# Patient Record
Sex: MALE | Race: OTHER | Employment: FULL TIME | URBAN - NONMETROPOLITAN AREA
[De-identification: names, ages, dates, MRNs, and addresses within clinical notes are randomized per-mention and may not be internally consistent; named-entity substitution may affect disease eponyms.]

---

## 2020-01-18 ENCOUNTER — OFFICE VISIT (OUTPATIENT)
Dept: FAMILY MEDICINE CLINIC | Age: 35
End: 2020-01-18
Payer: COMMERCIAL

## 2020-01-18 VITALS
HEIGHT: 63 IN | SYSTOLIC BLOOD PRESSURE: 132 MMHG | BODY MASS INDEX: 32.07 KG/M2 | DIASTOLIC BLOOD PRESSURE: 82 MMHG | HEART RATE: 62 BPM | WEIGHT: 181 LBS | OXYGEN SATURATION: 98 %

## 2020-01-18 PROCEDURE — 99202 OFFICE O/P NEW SF 15 MIN: CPT | Performed by: NURSE PRACTITIONER

## 2020-01-18 RX ORDER — FLUCONAZOLE 100 MG/1
100 TABLET ORAL DAILY
Qty: 5 TABLET | Refills: 0 | Status: SHIPPED | OUTPATIENT
Start: 2020-01-18 | End: 2020-01-23

## 2020-01-18 NOTE — PROGRESS NOTES
Review of Systems   Skin: Positive for rash. Objective:     /82 (Site: Left Upper Arm, Position: Sitting)   Pulse 62   Ht 5' 3\" (1.6 m)   Wt 181 lb (82.1 kg)   SpO2 98%   BMI 32.06 kg/m²     Physical Exam  Vitals signs reviewed. Constitutional:       Appearance: Normal appearance. Skin:     General: Skin is warm and dry. Capillary Refill: Capillary refill takes less than 2 seconds. Findings: Erythema and rash present. Neurological:      Mental Status: He is alert. Assessment:      Diagnosis Orders   1. Tinea pedis of both feet  fluconazole (DIFLUCAN) 100 MG tablet    triamcinolone (KENALOG) 0.1 % ointment   2. Dyshidrotic eczema  triamcinolone (KENALOG) 0.1 % ointment     No results found for this visit on 01/18/20. Plan:     Diflucan tablet daily for 5 days. Apply antifungal twice daily. Use triamcinolone ointment twice daily on weekends only. Follow up with primary care provider in 1 to 2 days if needed. Patient Instructions     Diflucan tablet daily for 5 days. Apply antifungal twice daily. Use triamcinolone ointment twice daily on weekends only. Follow up with primary care provider in 1 to 2 days if needed. Patient Education        Athlete's Foot: Care Instructions  Your Care Instructions    Athlete's foot is an itchy rash on the foot caused by an infection with a fungus. You can get it by going barefoot in wet public areas, such as swimming pools or locker rooms. Many times there is no clear reason why you get athlete's foot. You can easily treat athlete's foot by putting medicine on your feet for 1 to 6 weeks. In some cases, a doctor may prescribe pills to kill the fungus. Follow-up care is a key part of your treatment and safety. Be sure to make and go to all appointments, and call your doctor if you are having problems. It's also a good idea to know your test results and keep a list of the medicines you take.   How can you to things that cause allergies. The immune system is the body's way of fighting infection. There is no cure for atopic dermatitis, but you may be able to control it with care at home. Follow-up care is a key part of your treatment and safety. Be sure to make and go to all appointments, and call your doctor if you are having problems. It's also a good idea to know your test results and keep a list of the medicines you take. How can you care for yourself at home? · Use moisturizer at least twice a day. · If your doctor prescribes a cream, use it as directed. If your doctor prescribes other medicine, take it exactly as directed. · Wash the affected area with water only. Soap can make dryness and itching worse. Pat dry. · Apply a moisturizer after bathing. Use a cream such as Lubriderm, Moisturel, or Cetaphil that does not irritate the skin or cause a rash. Apply the cream while your skin is still damp after lightly drying with a towel. · Use cold, wet cloths to reduce itching. · Keep cool, and stay out of the sun. · If itching affects your normal activities, an over-the-counter antihistamine, such as diphenhydramine (Benadryl) or loratadine (Claritin) may help. Read and follow all instructions on the label. When should you call for help? Call your doctor now or seek immediate medical care if:    · Your rash gets worse and you have a fever.     · You have new blisters or bruises, or the rash spreads and looks like a sunburn.     · You have signs of infection, such as:  ? Increased pain, swelling, warmth, or redness. ? Red streaks leading from the rash. ? Pus draining from the rash.   ? A fever.     · You have crusting or oozing sores.     · You have joint aches or body aches along with your rash.    Watch closely for changes in your health, and be sure to contact your doctor if:    · Your rash does not clear up after 2 to 3 weeks of home treatment.     · Itching interferes with your sleep or daily activities. Where can you learn more? Go to https://chpepiceweb.[employer]. org and sign in to your [employer] account. Enter W656 in the [employer]it box to learn more about \"Atopic Dermatitis: Care Instructions. \"     If you do not have an account, please click on the \"Sign Up Now\" link. Current as of: April 1, 2019  Content Version: 12.3  © 2820-9220 Healthwise, [employer]. Care instructions adapted under license by [facility] ([facility]). If you have questions about a medical condition or this instruction, always ask your healthcare professional. [place] [address] any warranty or liability for your use of this information. Patient/Caregiver instructed on use, benefit, and side effects of prescribed medications. All patient/parent/caregiver questions answered. Patient/parent/caregiver voiced understanding. Reviewed health maintenance. Instructed to continue current medications, diet and exercise. Patient agreed with treatment plan. Follow up as directed.            Electronically signed by FARA Zapata CNP on1/18/2020

## 2020-01-18 NOTE — PATIENT INSTRUCTIONS
Diflucan tablet daily for 5 days. Apply antifungal twice daily. Use triamcinolone ointment twice daily on weekends only. Follow up with primary care provider in 1 to 2 days if needed. Patient Education        Athlete's Foot: Care Instructions  Your Care Instructions    Athlete's foot is an itchy rash on the foot caused by an infection with a fungus. You can get it by going barefoot in wet public areas, such as swimming pools or locker rooms. Many times there is no clear reason why you get athlete's foot. You can easily treat athlete's foot by putting medicine on your feet for 1 to 6 weeks. In some cases, a doctor may prescribe pills to kill the fungus. Follow-up care is a key part of your treatment and safety. Be sure to make and go to all appointments, and call your doctor if you are having problems. It's also a good idea to know your test results and keep a list of the medicines you take. How can you care for yourself at home? · Your doctor may suggest an over-the counter lotion or spray or may prescribe a medicine. Take your medicines exactly as prescribed. Call your doctor if you think you are having a problem with your medicine. · Keep your feet clean and dry. · When you get dressed, put your socks on before your underwear. This can prevent the fungus from spreading from your feet to your groin. To prevent athlete's foot  · Wear flip-flops or other shower sandals in public locker rooms and showers and by the pool. · Dry between your toes after swimming or bathing. · Wear leather shoes or sandals, which let air get to your feet. · Change your socks as needed so your feet stay as dry as possible. · Use antifungal powder on your feet. When should you call for help? Watch closely for changes in your health, and be sure to contact your doctor if:    · You do not get better as expected. Where can you learn more? Go to https://chbraydeneb.[facility]. org and sign in to your MyChart stay out of the sun. · If itching affects your normal activities, an over-the-counter antihistamine, such as diphenhydramine (Benadryl) or loratadine (Claritin) may help. Read and follow all instructions on the label. When should you call for help? Call your doctor now or seek immediate medical care if:    · Your rash gets worse and you have a fever.     · You have new blisters or bruises, or the rash spreads and looks like a sunburn.     · You have signs of infection, such as:  ? Increased pain, swelling, warmth, or redness. ? Red streaks leading from the rash. ? Pus draining from the rash. ? A fever.     · You have crusting or oozing sores.     · You have joint aches or body aches along with your rash.    Watch closely for changes in your health, and be sure to contact your doctor if:    · Your rash does not clear up after 2 to 3 weeks of home treatment.     · Itching interferes with your sleep or daily activities. Where can you learn more? Go to https://eDoorways InternationalpeLightSail Energyeweb.Paybubble. org and sign in to your [employer] account. Enter H751 in the [employer] box to learn more about \"Atopic Dermatitis: Care Instructions. \"     If you do not have an account, please click on the \"Sign Up Now\" link. Current as of: April 1, 2019  Content Version: 12.3  © 5538-7422 Healthwise, Inc[employer]. Care instructions adapted under license by [facility] ([facility]). If you have questions about a medical condition or this instruction, always ask your healthcare professional. [place] [address] any warranty or liability for your use of this information.

## 2020-12-09 ENCOUNTER — OFFICE VISIT (OUTPATIENT)
Dept: FAMILY MEDICINE CLINIC | Age: 35
End: 2020-12-09
Payer: COMMERCIAL

## 2020-12-09 ENCOUNTER — HOSPITAL ENCOUNTER (OUTPATIENT)
Age: 35
Setting detail: SPECIMEN
Discharge: HOME | End: 2020-12-09
Payer: COMMERCIAL

## 2020-12-09 VITALS
OXYGEN SATURATION: 98 % | SYSTOLIC BLOOD PRESSURE: 130 MMHG | TEMPERATURE: 99 F | HEART RATE: 51 BPM | WEIGHT: 185 LBS | BODY MASS INDEX: 32.77 KG/M2 | DIASTOLIC BLOOD PRESSURE: 78 MMHG

## 2020-12-09 LAB
INFLUENZA A ANTIBODY: NEGATIVE
INFLUENZA B ANTIBODY: NEGATIVE

## 2020-12-09 PROCEDURE — 99214 OFFICE O/P EST MOD 30 MIN: CPT

## 2020-12-09 PROCEDURE — U0003 INFECTIOUS AGENT DETECTION BY NUCLEIC ACID (DNA OR RNA); SEVERE ACUTE RESPIRATORY SYNDROME CORONAVIRUS 2 (SARS-COV-2) (CORONAVIRUS DISEASE [COVID-19]), AMPLIFIED PROBE TECHNIQUE, MAKING USE OF HIGH THROUGHPUT TECHNOLOGIES AS DESCRIBED BY CMS-2020-01-R: HCPCS

## 2020-12-09 PROCEDURE — 99214 OFFICE O/P EST MOD 30 MIN: CPT | Performed by: NURSE PRACTITIONER

## 2020-12-09 PROCEDURE — 87804 INFLUENZA ASSAY W/OPTIC: CPT | Performed by: NURSE PRACTITIONER

## 2020-12-09 RX ORDER — OSELTAMIVIR PHOSPHATE 75 MG/1
75 CAPSULE ORAL 2 TIMES DAILY
Qty: 10 CAPSULE | Refills: 0 | Status: SHIPPED | OUTPATIENT
Start: 2020-12-09 | End: 2020-12-14

## 2020-12-09 RX ORDER — ONDANSETRON 4 MG/1
4 TABLET, FILM COATED ORAL EVERY 8 HOURS PRN
Qty: 30 TABLET | Refills: 0 | Status: SHIPPED | OUTPATIENT
Start: 2020-12-09

## 2020-12-09 ASSESSMENT — PATIENT HEALTH QUESTIONNAIRE - PHQ9
SUM OF ALL RESPONSES TO PHQ QUESTIONS 1-9: 0
SUM OF ALL RESPONSES TO PHQ QUESTIONS 1-9: 0
1. LITTLE INTEREST OR PLEASURE IN DOING THINGS: 0
SUM OF ALL RESPONSES TO PHQ QUESTIONS 1-9: 0
2. FEELING DOWN, DEPRESSED OR HOPELESS: 0
SUM OF ALL RESPONSES TO PHQ9 QUESTIONS 1 & 2: 0

## 2020-12-09 ASSESSMENT — ENCOUNTER SYMPTOMS
DIARRHEA: 1
COUGH: 1
RHINORRHEA: 0
SORE THROAT: 0
NAUSEA: 1
SCALP TENDERNESS: 0
VOMITING: 0

## 2020-12-09 NOTE — LETTER
[facility]  [address]  Phone: [phone]  Fax: [phone]    FARA Saucedo NP        12/09/2020    Patient: Tiffani Sow   YOB: 1985   Date of Visit: 12/09/2020       To Whom it May Concern:    Aileen Gill was seen in my clinic on 12/09/2020 He may return to work/school after a negative covid test result (results expected in appx 5-7 days) and marked symptom improvement. Patient should be fever free for 24 hours without medication. If you have any questions or concerns, please don't hesitate to call.     Sincerely,         FARA Saucedo NP

## 2020-12-09 NOTE — PROGRESS NOTES
[address], APRN-CNP  [address]  Phone:  [phone]  Fax:  [phone]  Jacky Rojas is a 28 y.o. male who presents today for his medical conditions/complaints as noted below. Jacky Rojas c/o of Headache (nausea, fatigue, no fever or chills)      HPI:     Headache    This is a new problem. The current episode started yesterday. The problem has been waxing and waning. The pain is located in the bilateral region. The pain does not radiate. The quality of the pain is described as band-like and squeezing. The pain is at a severity of 9/10. Associated symptoms include anorexia, coughing and nausea. Pertinent negatives include no fever, rhinorrhea, scalp tenderness, sore throat or vomiting. Wt Readings from Last 3 Encounters:   12/09/20 185 lb (83.9 kg)   01/18/20 181 lb (82.1 kg)       Temp Readings from Last 3 Encounters:   12/09/20 99 °F (37.2 °C)       BP Readings from Last 3 Encounters:   12/09/20 130/78   01/18/20 132/82       Pulse Readings from Last 3 Encounters:   12/09/20 51   01/18/20 62              History reviewed. No pertinent past medical history. History reviewed. No pertinent surgical history. Family History   Problem Relation Age of Onset    Cancer Mother         ovarian     Social History     Tobacco Use    Smoking status: Never Smoker    Smokeless tobacco: Never Used   Substance Use Topics    Alcohol use: Not Currently      Current Outpatient Medications   Medication Sig Dispense Refill    ondansetron (ZOFRAN) 4 MG tablet Take 1 tablet by mouth every 8 hours as needed for Nausea 30 tablet 0    oseltamivir (TAMIFLU) 75 MG capsule Take 1 capsule by mouth 2 times daily for 5 days 10 capsule 0     No current facility-administered medications for this visit.       Allergies   Allergen Reactions    Dust Mite Extract Other (See Comments)     itchey eyes, runny nose, scratchy throat    Gramineae Pollens Other (See Comments)     Itchy eyes, scatchy throat, runny nose    Shellfish-Derived Products Other (See Comments)     Throat tightens up       No exam data present    Subjective:      Review of Systems   Constitutional: Positive for fatigue. Negative for chills and fever. HENT: Positive for congestion. Negative for rhinorrhea and sore throat. Respiratory: Positive for cough. Gastrointestinal: Positive for anorexia, diarrhea and nausea. Negative for vomiting. Skin: Negative for rash. Neurological: Positive for headaches. Objective:     /78 (Site: Right Upper Arm, Position: Sitting, Cuff Size: Large Adult)   Pulse 51   Temp 99 °F (37.2 °C)   Wt 185 lb (83.9 kg)   SpO2 98%   BMI 32.77 kg/m²     Physical Exam  Vitals signs reviewed. Constitutional:       General: He is not in acute distress. Appearance: He is well-developed. He is ill-appearing. He is not toxic-appearing or diaphoretic. HENT:      Head: Normocephalic. Right Ear: Tympanic membrane, ear canal and external ear normal.      Left Ear: Tympanic membrane, ear canal and external ear normal.      Nose: Congestion present. No mucosal edema or rhinorrhea. Mouth/Throat:      Mouth: Mucous membranes are moist. Mucous membranes are not pale and not dry. Pharynx: Oropharynx is clear. Eyes:      General: Lids are normal. No scleral icterus. Right eye: No discharge. Left eye: No discharge. Extraocular Movements:      Right eye: No nystagmus. Left eye: No nystagmus. Conjunctiva/sclera: Conjunctivae normal.   Neck:      Musculoskeletal: Full passive range of motion without pain and normal range of motion. Trachea: Trachea normal.   Cardiovascular:      Rate and Rhythm: Normal rate and regular rhythm. Heart sounds: Normal heart sounds. Pulmonary:      Effort: Pulmonary effort is normal. No accessory muscle usage or respiratory distress. Breath sounds: Normal breath sounds. Musculoskeletal: Normal range of motion. Skin:     General: Skin is warm and dry. Capillary Refill: Capillary refill takes less than 2 seconds. Coloration: Skin is not pale. Neurological:      Mental Status: He is alert and oriented to person, place, and time. Psychiatric:         Mood and Affect: Mood normal.         Speech: Speech normal.         Behavior: Behavior normal.         Thought Content: Thought content normal.         Judgment: Judgment normal.         Assessment:      Diagnosis Orders   1. Influenza A  oseltamivir (TAMIFLU) 75 MG capsule   2. Fatigue, unspecified type  POCT Influenza A/B    COVID-19 Ambulatory   3. Nausea  POCT Influenza A/B    COVID-19 Ambulatory    ondansetron (ZOFRAN) 4 MG tablet     No results found for this visit on 12/09/20. Plan:       Please go to the emergency room if you become short of breath, have weakness or extreme fatigue or if you feel you may be dehydrated. You may call the office if it is during normal business hours and request a nurse visit where we check you pulse oximetry/oxygen level. If your level is too low you will be sent to the hospital for further treatment. You are being provided a work or school excuse so you may quarantine until you test results are back. If you are COVID positive you will be given an additional excuse to lengthen your quarantine. Practice coughing and deep breathing every hour while awake. If you are laying down, change positions frequently. Try laying on your stomach to open up your lungs more. Follow up with primary care provider in 1 to 2 days if needed. Patient Instructions     Please go to the emergency room if you become short of breath, have weakness or extreme fatigue or if you feel you may be dehydrated. You may call the office if it is during normal business hours and request a nurse visit where we check you pulse oximetry/oxygen level.   If your level is too low you will be sent to the hospital for further treatment. You are being provided a work or school excuse so you may quarantine until you test results are back. If you are COVID positive you will be given an additional excuse to lengthen your quarantine. Practice coughing and deep breathing every hour while awake. If you are laying down, change positions frequently. Try laying on your stomach to open up your lungs more. Follow up with primary care provider in 1 to 2 days if needed. Tamiflu as directed. Zofran as needed for nausea. COVID note. Patient Education        Fatigue: Care Instructions  Your Care Instructions     Fatigue is a feeling of tiredness, exhaustion, or lack of energy. You may feel fatigue because of too much or not enough activity. It can also come from stress, lack of sleep, boredom, and poor diet. Many medical problems, such as viral infections, can cause fatigue. Emotional problems, especially depression, are often the cause of fatigue. Fatigue is most often a symptom of another problem. Treatment for fatigue depends on the cause. For example, if you have fatigue because you have a certain health problem, treating this problem also treats your fatigue. If depression or anxiety is the cause, treatment may help. Follow-up care is a key part of your treatment and safety. Be sure to make and go to all appointments, and call your doctor if you are having problems. It's also a good idea to know your test results and keep a list of the medicines you take. How can you care for yourself at home? · Get regular exercise. But don't overdo it. Go back and forth between rest and exercise. · Get plenty of rest.  · Eat a healthy diet. Do not skip meals, especially breakfast.  · Reduce your use of caffeine, tobacco, and alcohol. Caffeine is most often found in coffee, tea, cola drinks, and chocolate. · Limit medicines that can cause fatigue.  This includes tranquilizers and cold and allergy medicines. When should you call for help? Watch closely for changes in your health, and be sure to contact your doctor if:    · You have new symptoms such as fever or a rash.     · Your fatigue gets worse.     · You have been feeling down, depressed, or hopeless. Or you may have lost interest in things that you usually enjoy.     · You are not getting better as expected. Where can you learn more? Go to https://Biosynthetic TechnologiespeDiarize.Pinterest. org and sign in to your WeShow account. Enter R845 in the [employer] box to learn more about \"Fatigue: Care Instructions. \"     If you do not have an account, please click on the \"Sign Up Now\" link. Current as of: June 26, 2019               Content Version: 12.6  © 2172-9862 [employer]. Care instructions adapted under license by [facility] ([facility]). If you have questions about a medical condition or this instruction, always ask your healthcare professional. [place] [address] any warranty or liability for your use of this information. Patient Education        Nausea and Vomiting: Care Instructions  Your Care Instructions     When you are nauseated, you may feel weak and sweaty and notice a lot of saliva in your mouth. Nausea often leads to vomiting. Most of the time you do not need to worry about nausea and vomiting, but they can be signs of other illnesses. Two common causes of nausea and vomiting are stomach flu and food poisoning. Nausea and vomiting from viral stomach flu will usually start to improve within 24 hours. Nausea and vomiting from food poisoning may last from 12 to 48 hours. The doctor has checked you carefully, but problems can develop later. If you notice any problems or new symptoms, get medical treatment right away. Follow-up care is a key part of your treatment and safety. Be sure to make and go to all appointments, and call your doctor if you are having problems.  It's also a good idea to know your test results and keep a list of the medicines you take. How can you care for yourself at home? · To prevent dehydration, drink plenty of fluids, enough so that your urine is light yellow or clear like water. Choose water and other caffeine-free clear liquids until you feel better. If you have kidney, heart, or liver disease and have to limit fluids, talk with your doctor before you increase the amount of fluids you drink. · Rest in bed until you feel better. · When you are able to eat, try clear soups, mild foods, and liquids until all symptoms are gone for 12 to 48 hours. Other good choices include dry toast, crackers, cooked cereal, and gelatin dessert, such as Jell-O. When should you call for help? Call 911 anytime you think you may need emergency care. For example, call if:    · You passed out (lost consciousness). Call your doctor now or seek immediate medical care if:    · You have symptoms of dehydration, such as:  ? Dry eyes and a dry mouth. ? Passing only a little dark urine. ? Feeling thirstier than usual.     · You have new or worsening belly pain.     · You have a new or higher fever.     · You vomit blood or what looks like coffee grounds. Watch closely for changes in your health, and be sure to contact your doctor if:    · You have ongoing nausea and vomiting.     · Your vomiting is getting worse.     · Your vomiting lasts longer than 2 days.     · You are not getting better as expected. Where can you learn more? Go to https://Allied Resource CorporationpeIzzui.Location Labs. org and sign in to your Kin Community account. Enter [id number] in the [facility] box to learn more about \"Nausea and Vomiting: Care Instructions. \"     If you do not have an account, please click on the \"Sign Up Now\" link. Current as of: June 26, 2019               Content Version: 12.6  © 6418-8638 [employer]. Care instructions adapted under license by [facility] ([facility]).  If you have questions about a medical condition or this instruction, always ask your healthcare professional. [place] [address] any warranty or liability for your use of this information. Patient Education        Learning About Coronavirus [phone])  Coronavirus [phone]): Overview  What is coronavirus ([id number])? The coronavirus disease (COVID-19) is caused by a virus. It is an illness that was first found in December 2019. It has since spread worldwide. The virus can cause fever, cough, and trouble breathing. In severe cases, it can cause pneumonia and make it hard to breathe without help. It can cause death. This virus spreads person-to-person through droplets from coughing and sneezing. It can also spread when you are close to someone who is infected. And it can spread when you touch something that has the virus on it, such as a doorknob or a tabletop. Coronaviruses are a large group of viruses. They cause the common cold. They also cause more serious illnesses like Middle East respiratory syndrome (MERS) and severe acute respiratory syndrome (SARS). COVID-19 is caused by a novel coronavirus. That means it's a new type that has not been seen in people before. How is COVID-19 treated? Mild illness can be treated at home, but more serious illness needs to be treated in the hospital. Treatment may include medicines to reduce symptoms, plus breathing support such as oxygen therapy or a ventilator. Other treatments, such as antiviral medicines, may help people who have COVID-19. What can you do to protect yourself from COVID-19? The best way to protect yourself from getting sick is to:  · Avoid areas where there is an outbreak. · Avoid contact with people who may be infected. · Avoid crowds and try to stay at least 6 feet away from other people. · Wash your hands often, especially after you cough or sneeze. Use soap and water, and scrub for at least 20 seconds.  If soap and water aren't available, use an alcohol-based hand . · Avoid touching your mouth, nose, and eyes. What can you do to avoid spreading the virus to others? To help avoid spreading the virus to others:  · Wash your hands often with soap or alcohol-based hand sanitizers. · Cover your mouth with a tissue when you cough or sneeze. Then throw the tissue in the trash. · Use a disinfectant to clean things that you touch often. These include doorknobs, remote controls, phones, and handles on your refrigerator and microwave. And don't forget countertops, tabletops, bathrooms, and computer keyboards. · Wear a cloth face cover if you have to go to public areas. If you know or suspect that you have COVID-19:  · Stay home. Don't go to school, work, or public areas. And don't use public transportation, ride-shares, or taxis unless you have no choice. · Leave your home only if you need to get medical care or testing. But call the doctor's office first so they know you're coming. And wear a face cover. · Limit contact with people in your home. If possible, stay in a separate bedroom and use a separate bathroom. · Wear a face cover whenever you're around other people. It can help stop the spread of the virus when you cough or sneeze. · Clean and disinfect your home every day. Use household  and disinfectant wipes or sprays. Take special care to clean things that you grab with your hands. · Self-isolate until it's safe to be around others again. ? If you have symptoms, it's safe when you haven't had a fever for 3 days and your symptoms have improved and it's been at least 10 days since your symptoms started. ? If you were exposed to the virus but don't have symptoms, it's safe to be around others 14 days after exposure. ? Talk to your doctor about whether you also need testing, especially if you have a weakened immune system. When to call for help  Call 911 anytime you think you may need emergency care.  For example, call if:  · You have severe trouble breathing. (You can't talk at all.)  · You have constant chest pain or pressure. · You are severely dizzy or lightheaded. · You are confused or can't think clearly. · Your face and lips have a blue color. · You passed out (lost consciousness) or are very hard to wake up. Call your doctor now if you develop symptoms such as:  · Shortness of breath. · Fever. · Cough. If you need to get care, call ahead to the doctor's office for instructions before you go. Make sure you wear a face cover to prevent exposing other people to the virus. Where can you get the latest information? The following health organizations are tracking and studying this virus. Their websites contain the most up-to-date information. Shantelle Eli also learn what to do if you think you may have been exposed to the virus. · U.S. Centers for Disease Control and Prevention (CDC): The CDC provides updated news about the disease and travel advice. The website also tells you how to prevent the spread of infection. www.cdc.gov  · World Health Organization [facility]): WHO offers information about the virus outbreaks. WHO also has travel advice. www.who.int  Current as of: July 10, 2020               Content Version: 12.6  © 2006-2020 [employer]orporated. Care instructions adapted under license by [facility] ([facility]). If you have questions about a medical condition or this instruction, always ask your healthcare professional. [place] [address] any warranty or liability for your use of this information. Patient Education        Coronavirus ([id number]-13): Care Instructions  Overview  The coronavirus disease (COVID-19) is caused by a virus. Symptoms may include a fever, a cough, and shortness of breath. It mainly spreads person-to-person through droplets from coughing and sneezing. The virus also can spread when people are in close contact with someone who is infected.   Most people have mild symptoms and can take care of themselves Follow their instructions. And wear a cloth face cover. Current as of: July 10, 2020               Content Version: 12.6  © 2006-2020 [employer]. Care instructions adapted under license by [facility] ([facility]). If you have questions about a medical condition or this instruction, always ask your healthcare professional. [address] any warranty or liability for your use of this information. Patient Education        Influenza (Flu): Care Instructions  Your Care Instructions     Influenza (flu) is an infection in the lungs and breathing passages. It is caused by the influenza virus. There are different strains, or types, of the flu virus from year to year. Unlike the common cold, the flu comes on suddenly and the symptoms, such as a cough, congestion, fever, chills, fatigue, aches, and pains, are more severe. These symptoms may last up to 10 days. Although the flu can make you feel very sick, it usually doesn't cause serious health problems. Home treatment is usually all you need for flu symptoms. But your doctor may prescribe antiviral medicine to prevent other health problems, such as pneumonia, from developing. Older people and those who have a long-term health condition, such as lung disease, are most at risk for having pneumonia or other health problems. Follow-up care is a key part of your treatment and safety. Be sure to make and go to all appointments, and call your doctor if you are having problems. It's also a good idea to know your test results and keep a list of the medicines you take. How can you care for yourself at home? · Get plenty of rest.  · Drink plenty of fluids, enough so that your urine is light yellow or clear like water. If you have kidney, heart, or liver disease and have to limit fluids, talk with your doctor before you increase the amount of fluids you drink.   · Take an over-the-counter pain medicine if needed, such as acetaminophen (Tylenol), ibuprofen (Advil, Motrin), or naproxen (Aleve), to relieve fever, headache, and muscle aches. Read and follow all instructions on the label. No one younger than 20 should take aspirin. It has been linked to Reye syndrome, a serious illness. · Do not smoke. Smoking can make the flu worse. If you need help quitting, talk to your doctor about stop-smoking programs and medicines. These can increase your chances of quitting for good. · Breathe moist air from a hot shower or from a sink filled with hot water to help clear a stuffy nose. · Before you use cough and cold medicines, check the label. These medicines may not be safe for young children or for people with certain health problems. · If the skin around your nose and lips becomes sore, put some petroleum jelly on the area. · To ease coughing:  ? Drink fluids to soothe a scratchy throat. ? Suck on cough drops or plain hard candy. ? Take an over-the-counter cough medicine that contains dextromethorphan to help you get some sleep. Read and follow all instructions on the label. ? Raise your head at night with an extra pillow. This may help you rest if coughing keeps you awake. · Take any prescribed medicine exactly as directed. Call your doctor if you think you are having a problem with your medicine. To avoid spreading the flu  · Wash your hands regularly, and keep your hands away from your face. · Stay home from school, work, and other public places until you are feeling better and your fever has been gone for at least 24 hours. The fever needs to have gone away on its own without the help of medicine. · Ask people living with you to talk to their doctors about preventing the flu. They may get antiviral medicine to keep from getting the flu from you. · To prevent the flu in the future, get a flu vaccine every fall. Encourage people living with you to get the vaccine. · Cover your mouth when you cough or sneeze. When should you call for help?    Call [phone] anytime you think you may need emergency care. For example, call if:    · You have severe trouble breathing. Call your doctor now or seek immediate medical care if:    · You have new or worse trouble breathing.     · You seem to be getting much sicker.     · You feel very sleepy or confused.     · You have a new or higher fever.     · You get a new rash. Watch closely for changes in your health, and be sure to contact your doctor if:    · You begin to get better and then get worse.     · You are not getting better after 1 week. Where can you learn more? Go to https://Neopolitan NetworkspeMessageGearseb.mGaadi. org and sign in to your [place] [place] account. Enter X465 in the [facility] box to learn more about \"Influenza (Flu): Care Instructions. \"     If you do not have an account, please click on the \"Sign Up Now\" link. Current as of: February 24, 2020               Content Version: 12.6  © 2006-2020 [employer]. Care instructions adapted under license by [facility] ([facility]). If you have questions about a medical condition or this instruction, always ask your healthcare professional. [place] [address] any warranty or liability for your use of this information. Patient/Caregiver instructed on use, benefit, and side effects of prescribed medications. All patient/parent/caregiver questions answered. Patient/parent/caregiver voiced understanding. Reviewed health maintenance. Instructed to continue current medications, diet and exercise. Patient agreed with treatment plan. Follow up as directed.            Electronically signed by FARA Cope NP [phone]

## 2020-12-09 NOTE — PATIENT INSTRUCTIONS
Please go to the emergency room if you become short of breath, have weakness or extreme fatigue or if you feel you may be dehydrated. You may call the office if it is during normal business hours and request a nurse visit where we check you pulse oximetry/oxygen level. If your level is too low you will be sent to the hospital for further treatment. You are being provided a work or school excuse so you may quarantine until you test results are back. If you are COVID positive you will be given an additional excuse to lengthen your quarantine. Practice coughing and deep breathing every hour while awake. If you are laying down, change positions frequently. Try laying on your stomach to open up your lungs more. Follow up with primary care provider in 1 to 2 days if needed. Tamiflu as directed. Zofran as needed for nausea. COVID note. Patient Education        Fatigue: Care Instructions  Your Care Instructions     Fatigue is a feeling of tiredness, exhaustion, or lack of energy. You may feel fatigue because of too much or not enough activity. It can also come from stress, lack of sleep, boredom, and poor diet. Many medical problems, such as viral infections, can cause fatigue. Emotional problems, especially depression, are often the cause of fatigue. Fatigue is most often a symptom of another problem. Treatment for fatigue depends on the cause. For example, if you have fatigue because you have a certain health problem, treating this problem also treats your fatigue. If depression or anxiety is the cause, treatment may help. Follow-up care is a key part of your treatment and safety. Be sure to make and go to all appointments, and call your doctor if you are having problems. It's also a good idea to know your test results and keep a list of the medicines you take. How can you care for yourself at home? · Get regular exercise. But don't overdo it.  Go back and forth between rest and exercise. · Get plenty of rest.  · Eat a healthy diet. Do not skip meals, especially breakfast.  · Reduce your use of caffeine, tobacco, and alcohol. Caffeine is most often found in coffee, tea, cola drinks, and chocolate. · Limit medicines that can cause fatigue. This includes tranquilizers and cold and allergy medicines. When should you call for help? Watch closely for changes in your health, and be sure to contact your doctor if:    · You have new symptoms such as fever or a rash.     · Your fatigue gets worse.     · You have been feeling down, depressed, or hopeless. Or you may have lost interest in things that you usually enjoy.     · You are not getting better as expected. Where can you learn more? Go to https://[employer] Solutions.RedDrummer. org and sign in to your [employer]Pay account. Enter V025 in the [employer] box to learn more about \"Fatigue: Care Instructions. \"     If you do not have an account, please click on the \"Sign Up Now\" link. Current as of: June 26, 2019               Content Version: 12.6  © 4458-7158 [employer]. Care instructions adapted under license by [facility] ([facility]). If you have questions about a medical condition or this instruction, always ask your healthcare professional. [place] [address] any warranty or liability for your use of this information. Patient Education        Nausea and Vomiting: Care Instructions  Your Care Instructions     When you are nauseated, you may feel weak and sweaty and notice a lot of saliva in your mouth. Nausea often leads to vomiting. Most of the time you do not need to worry about nausea and vomiting, but they can be signs of other illnesses. Two common causes of nausea and vomiting are stomach flu and food poisoning. Nausea and vomiting from viral stomach flu will usually start to improve within 24 hours. Nausea and vomiting from food poisoning may last from 12 to 48 hours.   The doctor has checked you carefully, but problems can develop later. If you notice any problems or new symptoms, get medical treatment right away. Follow-up care is a key part of your treatment and safety. Be sure to make and go to all appointments, and call your doctor if you are having problems. It's also a good idea to know your test results and keep a list of the medicines you take. How can you care for yourself at home? · To prevent dehydration, drink plenty of fluids, enough so that your urine is light yellow or clear like water. Choose water and other caffeine-free clear liquids until you feel better. If you have kidney, heart, or liver disease and have to limit fluids, talk with your doctor before you increase the amount of fluids you drink. · Rest in bed until you feel better. · When you are able to eat, try clear soups, mild foods, and liquids until all symptoms are gone for 12 to 48 hours. Other good choices include dry toast, crackers, cooked cereal, and gelatin dessert, such as Jell-O. When should you call for help? Call 911 anytime you think you may need emergency care. For example, call if:    · You passed out (lost consciousness). Call your doctor now or seek immediate medical care if:    · You have symptoms of dehydration, such as:  ? Dry eyes and a dry mouth. ? Passing only a little dark urine. ? Feeling thirstier than usual.     · You have new or worsening belly pain.     · You have a new or higher fever.     · You vomit blood or what looks like coffee grounds. Watch closely for changes in your health, and be sure to contact your doctor if:    · You have ongoing nausea and vomiting.     · Your vomiting is getting worse.     · Your vomiting lasts longer than 2 days.     · You are not getting better as expected. Where can you learn more? Go to https://chpeshanaeweb.Rover Apps. org and sign in to your [employer] account.  Enter 79 [id number] in the [employer] box to learn more about \"Nausea and Vomiting: outbreak. · Avoid contact with people who may be infected. · Avoid crowds and try to stay at least 6 feet away from other people. · Wash your hands often, especially after you cough or sneeze. Use soap and water, and scrub for at least 20 seconds. If soap and water aren't available, use an alcohol-based hand . · Avoid touching your mouth, nose, and eyes. What can you do to avoid spreading the virus to others? To help avoid spreading the virus to others:  · Wash your hands often with soap or alcohol-based hand sanitizers. · Cover your mouth with a tissue when you cough or sneeze. Then throw the tissue in the trash. · Use a disinfectant to clean things that you touch often. These include doorknobs, remote controls, phones, and handles on your refrigerator and microwave. And don't forget countertops, tabletops, bathrooms, and computer keyboards. · Wear a cloth face cover if you have to go to public areas. If you know or suspect that you have COVID-19:  · Stay home. Don't go to school, work, or public areas. And don't use public transportation, ride-shares, or taxis unless you have no choice. · Leave your home only if you need to get medical care or testing. But call the doctor's office first so they know you're coming. And wear a face cover. · Limit contact with people in your home. If possible, stay in a separate bedroom and use a separate bathroom. · Wear a face cover whenever you're around other people. It can help stop the spread of the virus when you cough or sneeze. · Clean and disinfect your home every day. Use household  and disinfectant wipes or sprays. Take special care to clean things that you grab with your hands. · Self-isolate until it's safe to be around others again. ? If you have symptoms, it's safe when you haven't had a fever for 3 days and your symptoms have improved and it's been at least 10 days since your symptoms started.   ? If you were exposed to the virus but don't have symptoms, it's safe to be around others 14 days after exposure. ? Talk to your doctor about whether you also need testing, especially if you have a weakened immune system. When to call for help  Call 911 anytime you think you may need emergency care. For example, call if:  · You have severe trouble breathing. (You can't talk at all.)  · You have constant chest pain or pressure. · You are severely dizzy or lightheaded. · You are confused or can't think clearly. · Your face and lips have a blue color. · You passed out (lost consciousness) or are very hard to wake up. Call your doctor now if you develop symptoms such as:  · Shortness of breath. · Fever. · Cough. If you need to get care, call ahead to the doctor's office for instructions before you go. Make sure you wear a face cover to prevent exposing other people to the virus. Where can you get the latest information? The following health organizations are tracking and studying this virus. Their websites contain the most up-to-date information. [place] also learn what to do if you think you may have been exposed to the virus. · U.S. Centers for Disease Control and Prevention (CDC): The CDC provides updated news about the disease and travel advice. The website also tells you how to prevent the spread of infection. www.cdc.gov  · World Health Organization [facility]): WHO offers information about the virus outbreaks. WHO also has travel advice. www.who.int  Current as of: July 10, 2020               Content Version: 12.6  © 2006-2020 [employer]. Care instructions adapted under license by [facility] ([facility]). If you have questions about a medical condition or this instruction, always ask your healthcare professional. [place] [address] any warranty or liability for your use of this information.          Patient Education        Coronavirus (QHIMB-60): Care Instructions  Overview  The coronavirus disease (COVID-19) is caused by a and nose with a tissue when you cough or sneeze. Then throw it in the trash right away. · Wash your hands often, especially after you cough or sneeze. Use soap and water, and scrub for at least 20 seconds. If soap and water aren't available, use an alcohol-based hand . · Don't share personal household items. These include bedding, towels, cups and glasses, and eating utensils. · [address] in the warmest water allowed for the fabric type, and dry it completely. It's okay to wash other people's laundry with yours. · Clean and disinfect your home every day. Use household  and disinfectant wipes or sprays. Take special care to clean things that you grab with your hands. These include doorknobs, remote controls, phones, and handles on your refrigerator and microwave. And don't forget countertops, tabletops, bathrooms, and computer keyboards. When you can end self-isolation  · If you know or suspect that you have COVID-19, stay in self-isolation until:  ? You haven't had a fever for 3 days, and  ? Your symptoms have improved, and  ? It's been at least 10 days since your symptoms started. · Talk to your doctor about whether you also need testing, especially if you have a weakened immune system. When should you call for help? Call 911 anytime you think you may need emergency care. For example, call if you have life-threatening symptoms, such as:    · You have severe trouble breathing. (You can't talk at all.)     · You have constant chest pain or pressure.     · You are severely dizzy or lightheaded.     · You are confused or can't think clearly.     · Your face and lips have a blue color.     · You pass out (lose consciousness) or are very hard to wake up. Call your doctor now or seek immediate medical care if:    · You have moderate trouble breathing. (You can't speak a full sentence.)     · You are coughing up blood (more than about 1 teaspoon).     · You have signs of low blood pressure. These include feeling lightheaded; being too weak to stand; and having cold, pale, clammy skin. Watch closely for changes in your health, and be sure to contact your doctor if:    · Your symptoms get worse.     · You are not getting better as expected. Call before you go to the doctor's office. Follow their instructions. And wear a cloth face cover. Current as of: July 10, 2020               Content Version: 12.6  © 2006-2020 [employer]. Care instructions adapted under license by [facility] ([facility]). If you have questions about a medical condition or this instruction, always ask your healthcare professional. [place] [address] any warranty or liability for your use of this information. Patient Education        Influenza (Flu): Care Instructions  Your Care Instructions     Influenza (flu) is an infection in the lungs and breathing passages. It is caused by the influenza virus. There are different strains, or types, of the flu virus from year to year. Unlike the common cold, the flu comes on suddenly and the symptoms, such as a cough, congestion, fever, chills, fatigue, aches, and pains, are more severe. These symptoms may last up to 10 days. Although the flu can make you feel very sick, it usually doesn't cause serious health problems. Home treatment is usually all you need for flu symptoms. But your doctor may prescribe antiviral medicine to prevent other health problems, such as pneumonia, from developing. Older people and those who have a long-term health condition, such as lung disease, are most at risk for having pneumonia or other health problems. Follow-up care is a key part of your treatment and safety. Be sure to make and go to all appointments, and call your doctor if you are having problems. It's also a good idea to know your test results and keep a list of the medicines you take. How can you care for yourself at home?   · Get plenty of rest.  · Drink plenty of preventing the flu. They may get antiviral medicine to keep from getting the flu from you. · To prevent the flu in the future, get a flu vaccine every fall. Encourage people living with you to get the vaccine. · Cover your mouth when you cough or sneeze. When should you call for help? Call 911 anytime you think you may need emergency care. For example, call if:    · You have severe trouble breathing. Call your doctor now or seek immediate medical care if:    · You have new or worse trouble breathing.     · You seem to be getting much sicker.     · You feel very sleepy or confused.     · You have a new or higher fever.     · You get a new rash. Watch closely for changes in your health, and be sure to contact your doctor if:    · You begin to get better and then get worse.     · You are not getting better after 1 week. Where can you learn more? Go to https://Durianapepiceweb.Encentuate. org and sign in to your Sandbox account. Enter A443 in the [employer] box to learn more about \"Influenza (Flu): Care Instructions. \"     If you do not have an account, please click on the \"Sign Up Now\" link. Current as of: February 24, 2020               Content Version: 12.6  © 2006-2020 [employer]. Care instructions adapted under license by [facility] ([facility]). If you have questions about a medical condition or this instruction, always ask your healthcare professional. [place] [address] any warranty or liability for your use of this information.

## 2020-12-13 LAB — SARS-COV-2, NAA: NOT DETECTED

## 2020-12-14 ENCOUNTER — TELEPHONE (OUTPATIENT)
Dept: FAMILY MEDICINE CLINIC | Age: 35
End: 2020-12-14

## 2020-12-14 NOTE — TELEPHONE ENCOUNTER
Called and left message stating negative Covid test results from 12/9/2020 were negative and to call back with any questions.